# Patient Record
Sex: FEMALE | Race: WHITE | NOT HISPANIC OR LATINO | Employment: PART TIME | URBAN - METROPOLITAN AREA
[De-identification: names, ages, dates, MRNs, and addresses within clinical notes are randomized per-mention and may not be internally consistent; named-entity substitution may affect disease eponyms.]

---

## 2023-05-02 ENCOUNTER — OFFICE VISIT (OUTPATIENT)
Dept: ORTHOPEDIC SURGERY | Facility: CLINIC | Age: 33
End: 2023-05-02
Payer: COMMERCIAL

## 2023-05-02 VITALS
SYSTOLIC BLOOD PRESSURE: 132 MMHG | HEIGHT: 61 IN | BODY MASS INDEX: 46.82 KG/M2 | DIASTOLIC BLOOD PRESSURE: 90 MMHG | WEIGHT: 248 LBS

## 2023-05-02 DIAGNOSIS — G89.4 CHRONIC PAIN SYNDROME: ICD-10-CM

## 2023-05-02 DIAGNOSIS — M75.111 NONTRAUMATIC INCOMPLETE TEAR OF RIGHT ROTATOR CUFF: Primary | ICD-10-CM

## 2023-05-02 DIAGNOSIS — M25.511 RIGHT SHOULDER PAIN, UNSPECIFIED CHRONICITY: ICD-10-CM

## 2023-05-02 DIAGNOSIS — M75.21 BICEPS TENDINITIS OF RIGHT UPPER EXTREMITY: ICD-10-CM

## 2023-05-02 RX ORDER — LIDOCAINE HYDROCHLORIDE 10 MG/ML
5 INJECTION, SOLUTION EPIDURAL; INFILTRATION; INTRACAUDAL; PERINEURAL
Status: COMPLETED | OUTPATIENT
Start: 2023-05-02 | End: 2023-05-02

## 2023-05-02 RX ORDER — HYDROXYCHLOROQUINE SULFATE 200 MG/1
TABLET, FILM COATED ORAL
COMMUNITY
Start: 2023-04-26

## 2023-05-02 RX ORDER — SERTRALINE HYDROCHLORIDE 100 MG/1
TABLET, FILM COATED ORAL
COMMUNITY
Start: 2023-04-26

## 2023-05-02 RX ORDER — METFORMIN HYDROCHLORIDE 750 MG/1
TABLET, EXTENDED RELEASE ORAL
COMMUNITY
Start: 2023-04-26

## 2023-05-02 RX ORDER — TRIAMCINOLONE ACETONIDE 40 MG/ML
40 INJECTION, SUSPENSION INTRA-ARTICULAR; INTRAMUSCULAR
Status: COMPLETED | OUTPATIENT
Start: 2023-05-02 | End: 2023-05-02

## 2023-05-02 RX ORDER — PREGABALIN 50 MG/1
CAPSULE ORAL
COMMUNITY
Start: 2023-04-04

## 2023-05-02 RX ORDER — NALOXONE HYDROCHLORIDE 4 MG/.1ML
SPRAY NASAL
COMMUNITY
Start: 2023-03-10

## 2023-05-02 RX ORDER — OXYCODONE AND ACETAMINOPHEN 7.5; 325 MG/1; MG/1
TABLET ORAL
COMMUNITY
Start: 2023-04-04

## 2023-05-02 RX ORDER — DIAZEPAM 10 MG/1
TABLET ORAL
COMMUNITY
Start: 2023-04-04

## 2023-05-02 RX ADMIN — LIDOCAINE HYDROCHLORIDE 5 ML: 10 INJECTION, SOLUTION EPIDURAL; INFILTRATION; INTRACAUDAL; PERINEURAL at 12:19

## 2023-05-02 RX ADMIN — TRIAMCINOLONE ACETONIDE 40 MG: 40 INJECTION, SUSPENSION INTRA-ARTICULAR; INTRAMUSCULAR at 12:19

## 2023-05-02 NOTE — PROGRESS NOTES
"                                                                    [facility] Orthopaedic Surgery Office Visit - Silver Aranda MD    Office Visit       Patient Name: Maria L Anne    Chief Complaint:   Chief Complaint   Patient presents with   • Right Shoulder - Pain       Referring Physician: Patricia Lewis, *-I appreciate the referral    History of Present Illness:   Maria L Anne is a 33 y.o. female who presents with right body part: shoulder Reason: pain.  Onset:Onset: atraumatic and gradual in nature. The issue has been ongoing for 1 year(s). Pain is a 8/10 on the pain scale. Pain is described as Pain Characterization: dull, aching, burning, throbbing, stabbing and shooting. Associated symptoms include Symptoms: pain, popping and giving way/buckling. The pain is worse with any movement of the joint; resting and heat improve the pain. Previous treatments have included: NSAIDS and physical therapy.  I have reviewed the patient's history of present illness as noted/entered above.    I have reviewed the patient's past medical history, surgical history, social history, family history, medications, and allergies as noted in the electronic medical record and as noted/entered.  I have reviewed the patient's review of systems as noted/enter and updated as noted in the patient's HPI.    Right shoulder pain chronic ongoing for 1+ year  Rates the pain is 8 out of 10    Supportive significant other present    Neck issues, scoliosis, \"nerve damage \", treated with chronic pain medication Percocet, some of her symptomatology may be more attributable to her known neck issues.  She does describe some radiating pain.    From [place] in [facility]  No trauma to the shoulder  8 sessions of physical therapy, KT taping  No prior shoulder injection    Generalized shoulder pain nonspecific to the rotator cuff, multiple trigger points, significant pain response    Unfortunately report only for her MRI and she will provide a " copy of the CD by mail so that I can personally review the images.  It is described as a fairly small partial rotator cuff tear.  Happy to personally review the images to further guide decision making.      33 y.o. female  Body mass index is 46.86 kg/m².    Subjective   Subjective      Review of Systems   Constitutional: Negative.  Negative for chills, fatigue and fever.   HENT: Negative.  Negative for congestion and dental problem.    Eyes: Negative.  Negative for blurred vision.   Respiratory: Negative.  Negative for shortness of breath.    Cardiovascular: Negative.  Negative for leg swelling.   Gastrointestinal: Negative.  Negative for abdominal pain.   Endocrine: Negative.  Negative for polyuria.   Genitourinary: Negative.  Negative for difficulty urinating.   Musculoskeletal: Positive for arthralgias.   Skin: Negative.    Allergic/Immunologic: Negative.    Neurological: Negative.    Hematological: Negative.  Negative for adenopathy.   Psychiatric/Behavioral: Negative.  Negative for behavioral problems.        Past Medical History:   Past Medical History:   Diagnosis Date   • Endometriosis    • Lupus    • Scoliosis        Past Surgical History:   Past Surgical History:   Procedure Laterality Date   • DIAGNOSTIC LAPAROSCOPY     • TONSILLECTOMY         Family History:   Family History   Problem Relation Age of Onset   • Cancer Maternal Grandmother        Social History:   Social History     Socioeconomic History   • Marital status: Single   Tobacco Use   • Smoking status: Never   Substance and Sexual Activity   • Alcohol use: Not Currently   • Drug use: Defer   • Sexual activity: Defer       Medications:   Current Outpatient Medications:   •  diazePAM (VALIUM) 10 MG tablet, , Disp: , Rfl:   •  hydroxychloroquine (PLAQUENIL) 200 MG tablet, , Disp: , Rfl:   •  metFORMIN ER (GLUCOPHAGE-XR) 750 MG 24 hr tablet, , Disp: , Rfl:   •  naloxone (NARCAN) 4 MG/0.1ML nasal spray, , Disp: , Rfl:   •  oxyCODONE-acetaminophen  "(PERCOCET) 7.5-325 MG per tablet, , Disp: , Rfl:   •  pregabalin (LYRICA) 50 MG capsule, , Disp: , Rfl:   •  sertraline (ZOLOFT) 100 MG tablet, , Disp: , Rfl:     Allergies:   Allergies   Allergen Reactions   • Penicillins Swelling   • Metronidazole Other (See Comments)     Pt unsure - happened when she was younger        The following portions of the patient's history were reviewed and updated as appropriate: allergies, current medications, past family history, past medical history, past social history, past surgical history and problem list.        Objective    Objective      Vital Signs:   Vitals:    05/02/23 1204   BP: 132/90   Weight: 112 kg (248 lb)   Height: 154.9 cm (61\")       Ortho Exam:  Right shoulder generalized pain multiple trigger points noted no skin changes no evidence of septic joint    No specific rotator cuff tenderness good rotator cuff strength on clinical exam.  Positive Neer positive Aleman  Mild anterior biceps symptomatology  No obvious evidence of frozen shoulder  Generalized shoulder and periscapular pain      Results Review:   Imaging Results (Last 24 Hours)     Procedure Component Value Units Date/Time    XR Shoulder 2+ View Right [[id number]] Resulted: 05/02/23 1223     Updated: 05/02/23 1224    Narrative:      Imaging: shoulder x-rays 3 views - AP, axillary, and scapular-Y x-ray   views    Side: RIGHT SHOULDER    Indication for shoulder x-ray 3 views: shoulder pain    Comparison: no comparison views available    Findings: No acute bony pathology. No superior humeral head migration.    The humeral head remains centered in the glenohumeral joint. No evidence   of calcific tendonitis but small incidental very small ossicle in soft   tissues.      I personally reviewed the above x-rays.          XR Shoulder 2+ View Right    Result Date: 1/5/2023  No acute fracture or dislocation of the right shoulder. Report Sign Date: 1/5/2023 5:13 PM, Electronically Signed By: Jani Banuelos MD    MRI " Shoulder Right Without Contrast    Result Date: 3/30/2023  1. Tendinosis and peritendinitis of the supraspinatus tendon with small full-thickness partial tear involving the insertion. 2. Small amount of fluid within the acromioclavicular joint. Report Sign Date: 3/30/2023 9:21 AM, Electronically Signed By: Clint Chavis MD      Report only MRI she will provide the official CD for me to review    Procedures     RIGHT SHOULDER SUBACROMIAL SPACE INJECTION: Risks and benefits of a shoulder subacromial space injection were discussed and the patient desired to proceed. Verbal consent was obtained. The patient understood the risk of infection, potential skin changes, bump in blood glucose especially with diabetes, nerve injury, possibility of increased pain in the short term, and possible incomplete pain relief.  Using sterile technique, the shoulder subacromial space was injected from a posterior approach with 1mL of 40 mg triamcinolone acetonide 40 MG/ML and 4cc of lidocaine with aspiration prior to injection. The patient tolerated the procedure without difficulty.  CPT CODE 75510 for major joint aspiration/injection          Assessment / Plan      Assessment/Plan:   Problem List Items Addressed This Visit        Musculoskeletal and Injuries    Nontraumatic incomplete tear of right rotator cuff - Primary    Relevant Orders    Ambulatory Referral to Physical Therapy Evaluate and treat, Ortho (Completed)    Biceps tendinitis of right upper extremity    Relevant Orders    Ambulatory Referral to Physical Therapy Evaluate and treat, Ortho (Completed)       Neuro    Chronic pain syndrome    Relevant Orders    Ambulatory Referral to Physical Therapy Evaluate and treat, Ortho (Completed)   Other Visit Diagnoses     Right shoulder pain, unspecified chronicity        Relevant Orders    XR Shoulder 2+ View Right (Completed)    Ambulatory Referral to Physical Therapy Evaluate and treat, Ortho (Completed)          Right  shoulder  Unfortunately report only for her MRI and she will provide a copy of the CD by mail so that I can personally review the images.  It is described as a fairly small partial rotator cuff tear.  Happy to personally review the images to further guide decision making.    I would like to review the MRI personally.  We counseled on operative versus nonoperative measures.  Nonoperative measures recommended.  Guarded prognosis with chronic pain and chronic opioid use.  PT Rx provided to continue with physical therapy.  She ultimately may benefit from trigger point injections to be performed locally with her pain management team.  Her pain is generalized and not specific to the rotator cuff.    Follow Up: 2 months she will provide the CD of the MRI in the interim        Silver Aranda MD, FAAOS  Orthopedic Surgeon  Fellowship Trained Shoulder and Elbow Surgeon  [facility]  Orthopedics and Sports Medicine  [address] [place] [address], [address]  [place] [address]    05/02/23  16:04 EDT

## 2023-05-02 NOTE — PROGRESS NOTES
Procedure   Large Joint Arthrocentesis: R subacromial bursa  Date/Time: 5/2/2023 12:19 PM  Consent given by: patient  Site marked: site marked  Timeout: Immediately prior to procedure a time out was called to verify the correct patient, procedure, equipment, support staff and site/side marked as required   Supporting Documentation  Indications: pain   Procedure Details  Location: shoulder - R subacromial bursa  Preparation: Patient was prepped and draped in the usual sterile fashion  Needle gauge: 21g.  Approach: posterior  Medications administered: 5 mL lidocaine PF 1% 1 %; 40 mg triamcinolone acetonide 40 MG/ML  Patient tolerance: patient tolerated the procedure well with no immediate complications